# Patient Record
Sex: MALE | Race: WHITE | NOT HISPANIC OR LATINO | URBAN - METROPOLITAN AREA
[De-identification: names, ages, dates, MRNs, and addresses within clinical notes are randomized per-mention and may not be internally consistent; named-entity substitution may affect disease eponyms.]

---

## 2020-07-25 ENCOUNTER — TELEPHONE ENCOUNTER (OUTPATIENT)
Dept: URBAN - METROPOLITAN AREA CLINIC 13 | Facility: CLINIC | Age: 55
End: 2020-07-25

## 2020-07-25 RX ORDER — POLYETHYLENE GLYCOL 3350, SODIUM SULFATE, SODIUM CHLORIDE, POTASSIUM CHLORIDE, ASCORBIC ACID, SODIUM ASCORBATE 7.5-2.691G
TAKE 32 OZ AS DIRECTED 5:00PM THE EVENING BEFORE AND 6HR PRIOR TO PROCEDURE KIT ORAL
Qty: 1 | Refills: 0 | OUTPATIENT
Start: 2015-01-27 | End: 2015-04-27

## 2020-07-25 RX ORDER — NAPROXEN SODIUM 220 MG
TAKE 1 TABLET EVERY 12 HOURS DAILY TABLET ORAL
Refills: 0 | OUTPATIENT
End: 2015-04-27

## 2020-07-26 ENCOUNTER — TELEPHONE ENCOUNTER (OUTPATIENT)
Dept: URBAN - METROPOLITAN AREA CLINIC 13 | Facility: CLINIC | Age: 55
End: 2020-07-26

## 2020-07-26 RX ORDER — LEVOFLOXACIN 500 MG/1
TABLET, FILM COATED ORAL
Qty: 14 | Refills: 0 | Status: ACTIVE | COMMUNITY
Start: 2015-03-12

## 2020-07-26 RX ORDER — OMEPRAZOLE 20 MG/1
CAPSULE, DELAYED RELEASE ORAL
Qty: 30 | Refills: 0 | Status: ACTIVE | COMMUNITY
Start: 2015-02-10

## 2020-07-26 RX ORDER — AZELASTINE HYDROCHLORIDE AND FLUTICASONE PROPIONATE 137; 50 UG/1; UG/1
SPRAY, METERED NASAL
Qty: 23 | Refills: 0 | Status: ACTIVE | COMMUNITY
Start: 2015-03-06

## 2020-07-26 RX ORDER — ROSUVASTATIN CALCIUM 20 MG
TAKE 1 TABLET DAILY TABLET ORAL
Refills: 0 | Status: ACTIVE | COMMUNITY

## 2021-05-13 ENCOUNTER — LAB OUTSIDE AN ENCOUNTER (OUTPATIENT)
Dept: URBAN - METROPOLITAN AREA CLINIC 113 | Facility: CLINIC | Age: 56
End: 2021-05-13

## 2021-05-13 ENCOUNTER — TELEPHONE ENCOUNTER (OUTPATIENT)
Dept: URBAN - METROPOLITAN AREA CLINIC 113 | Facility: CLINIC | Age: 56
End: 2021-05-13

## 2021-05-13 VITALS — HEIGHT: 72 IN | BODY MASS INDEX: 34.13 KG/M2 | WEIGHT: 252 LBS

## 2021-05-13 RX ORDER — ROSUVASTATIN CALCIUM 20 MG
TAKE 1 TABLET DAILY TABLET ORAL
Refills: 0 | Status: ACTIVE | COMMUNITY

## 2021-05-13 RX ORDER — OMEPRAZOLE 20 MG/1
CAPSULE, DELAYED RELEASE ORAL
Qty: 30 | Refills: 0 | Status: ACTIVE | COMMUNITY
Start: 2015-02-10

## 2021-05-13 RX ORDER — AZELASTINE HYDROCHLORIDE AND FLUTICASONE PROPIONATE 137; 50 UG/1; UG/1
SPRAY, METERED NASAL
Qty: 23 | Refills: 0 | Status: DISCONTINUED | COMMUNITY
Start: 2015-03-06

## 2021-05-13 RX ORDER — SODIUM, POTASSIUM,MAG SULFATES 17.5-3.13G
354ML SOLUTION, RECONSTITUTED, ORAL ORAL
Qty: 354 MILLILITER | Refills: 0 | OUTPATIENT
Start: 2021-05-13 | End: 2021-05-14

## 2021-05-13 RX ORDER — LEVOFLOXACIN 500 MG/1
TABLET, FILM COATED ORAL
Qty: 14 | Refills: 0 | Status: DISCONTINUED | COMMUNITY
Start: 2015-03-12

## 2021-05-13 RX ORDER — AMLODIPINE AND ATORVASTATIN 5; 10 MG/1; MG/1
1 TABLET TABLET, FILM COATED ORAL ONCE A DAY
Status: ACTIVE | COMMUNITY

## 2021-06-10 ENCOUNTER — TELEPHONE ENCOUNTER (OUTPATIENT)
Dept: URBAN - METROPOLITAN AREA CLINIC 113 | Facility: CLINIC | Age: 56
End: 2021-06-10

## 2021-06-14 ENCOUNTER — OFFICE VISIT (OUTPATIENT)
Dept: URBAN - METROPOLITAN AREA SURGERY CENTER 25 | Facility: SURGERY CENTER | Age: 56
End: 2021-06-14

## 2021-06-28 ENCOUNTER — OFFICE VISIT (OUTPATIENT)
Dept: URBAN - METROPOLITAN AREA SURGERY CENTER 25 | Facility: SURGERY CENTER | Age: 56
End: 2021-06-28
Payer: COMMERCIAL

## 2021-06-28 ENCOUNTER — CLAIMS CREATED FROM THE CLAIM WINDOW (OUTPATIENT)
Dept: URBAN - METROPOLITAN AREA CLINIC 4 | Facility: CLINIC | Age: 56
End: 2021-06-28
Payer: COMMERCIAL

## 2021-06-28 DIAGNOSIS — D12.2 ADENOMA OF ASCENDING COLON: ICD-10-CM

## 2021-06-28 DIAGNOSIS — Z86.010 H/O ADENOMATOUS POLYP OF COLON: ICD-10-CM

## 2021-06-28 DIAGNOSIS — K51.40 INFLAMMATORY POLYPS OF COLON WITHOUT COMPLICATIONS: ICD-10-CM

## 2021-06-28 DIAGNOSIS — K63.89 POLYP OF ILEUM: ICD-10-CM

## 2021-06-28 PROCEDURE — 88305 TISSUE EXAM BY PATHOLOGIST: CPT | Performed by: PATHOLOGY

## 2021-06-28 PROCEDURE — G8907 PT DOC NO EVENTS ON DISCHARG: HCPCS | Performed by: INTERNAL MEDICINE

## 2021-06-28 PROCEDURE — 45385 COLONOSCOPY W/LESION REMOVAL: CPT | Performed by: INTERNAL MEDICINE

## 2021-06-28 RX ORDER — OMEPRAZOLE 20 MG/1
CAPSULE, DELAYED RELEASE ORAL
Qty: 30 | Refills: 0 | Status: ACTIVE | COMMUNITY
Start: 2015-02-10

## 2021-06-28 RX ORDER — ROSUVASTATIN CALCIUM 20 MG
TAKE 1 TABLET DAILY TABLET ORAL
Refills: 0 | Status: ACTIVE | COMMUNITY

## 2021-06-28 RX ORDER — AMLODIPINE AND ATORVASTATIN 5; 10 MG/1; MG/1
1 TABLET TABLET, FILM COATED ORAL ONCE A DAY
Status: ACTIVE | COMMUNITY

## 2021-08-20 ENCOUNTER — OFFICE VISIT (OUTPATIENT)
Dept: URBAN - METROPOLITAN AREA CLINIC 113 | Facility: CLINIC | Age: 56
End: 2021-08-20

## 2021-08-20 ENCOUNTER — TELEPHONE ENCOUNTER (OUTPATIENT)
Dept: URBAN - METROPOLITAN AREA CLINIC 107 | Facility: CLINIC | Age: 56
End: 2021-08-20

## 2021-08-20 RX ORDER — OMEPRAZOLE 20 MG/1
CAPSULE, DELAYED RELEASE ORAL
Qty: 30 | Refills: 0 | Status: ACTIVE | COMMUNITY
Start: 2015-02-10

## 2021-08-20 RX ORDER — ROSUVASTATIN CALCIUM 20 MG
TAKE 1 TABLET DAILY TABLET ORAL
Refills: 0 | Status: ACTIVE | COMMUNITY

## 2021-08-20 RX ORDER — AMLODIPINE AND ATORVASTATIN 5; 10 MG/1; MG/1
1 TABLET TABLET, FILM COATED ORAL ONCE A DAY
Status: ACTIVE | COMMUNITY

## 2021-09-20 ENCOUNTER — TELEPHONE ENCOUNTER (OUTPATIENT)
Dept: URBAN - METROPOLITAN AREA CLINIC 113 | Facility: CLINIC | Age: 56
End: 2021-09-20

## 2023-03-23 ENCOUNTER — LAB OUTSIDE AN ENCOUNTER (OUTPATIENT)
Dept: URBAN - METROPOLITAN AREA CLINIC 113 | Facility: CLINIC | Age: 58
End: 2023-03-23

## 2023-03-23 ENCOUNTER — WEB ENCOUNTER (OUTPATIENT)
Dept: URBAN - METROPOLITAN AREA CLINIC 113 | Facility: CLINIC | Age: 58
End: 2023-03-23

## 2023-03-23 ENCOUNTER — OFFICE VISIT (OUTPATIENT)
Dept: URBAN - METROPOLITAN AREA CLINIC 113 | Facility: CLINIC | Age: 58
End: 2023-03-23
Payer: COMMERCIAL

## 2023-03-23 VITALS
DIASTOLIC BLOOD PRESSURE: 74 MMHG | BODY MASS INDEX: 34.13 KG/M2 | WEIGHT: 252 LBS | HEART RATE: 72 BPM | HEIGHT: 72 IN | RESPIRATION RATE: 18 BRPM | TEMPERATURE: 97.3 F | SYSTOLIC BLOOD PRESSURE: 114 MMHG

## 2023-03-23 DIAGNOSIS — Z86.010 HISTORY OF ADENOMATOUS POLYP OF COLON: ICD-10-CM

## 2023-03-23 DIAGNOSIS — R74.8 ELEVATED LIVER ENZYMES: ICD-10-CM

## 2023-03-23 DIAGNOSIS — K21.9 GASTROESOPHAGEAL REFLUX DISEASE WITHOUT ESOPHAGITIS: ICD-10-CM

## 2023-03-23 DIAGNOSIS — K76.0 FATTY LIVER: ICD-10-CM

## 2023-03-23 DIAGNOSIS — K22.70 BARRETT'S ESOPHAGUS WITHOUT DYSPLASIA: ICD-10-CM

## 2023-03-23 PROBLEM — 429047008: Status: ACTIVE | Noted: 2023-03-23

## 2023-03-23 PROBLEM — 266435005: Status: ACTIVE | Noted: 2023-03-23

## 2023-03-23 PROBLEM — 302914006: Status: ACTIVE | Noted: 2023-03-23

## 2023-03-23 PROCEDURE — 99214 OFFICE O/P EST MOD 30 MIN: CPT | Performed by: NURSE PRACTITIONER

## 2023-03-23 RX ORDER — OMEPRAZOLE 20 MG/1
CAPSULE, DELAYED RELEASE ORAL
Qty: 30 | Refills: 0 | Status: ACTIVE | COMMUNITY
Start: 2015-02-10

## 2023-03-23 RX ORDER — AMLODIPINE AND ATORVASTATIN 5; 10 MG/1; MG/1
1 TABLET TABLET, FILM COATED ORAL ONCE A DAY
Status: ON HOLD | COMMUNITY

## 2023-03-23 RX ORDER — ROSUVASTATIN CALCIUM 20 MG
TAKE 1 TABLET DAILY TABLET ORAL
Refills: 0 | Status: ACTIVE | COMMUNITY

## 2023-03-23 NOTE — HPI-OTHER HISTORIES
Labs 2/14/2023 (reviewed post visit):BMP normal with exception of glucose 113, albumin 5.  LFTs: TB 0.5, ALP 74, , AST 86.  Cholesterol panel normal with exception of triglycerides 307, HDL 36, VLDL 51.  Hemoglobin A1c 5.8.  HIV nonreactive. Colonoscopy 6/28/2021: BBPS 9 (Suprep), sigmoid and descending diverticulosis, mild grade 1 internal hemorrhoids, removal of 2 semipedunculated transverse and ascending 9-11 mm polyps, normal terminal ileum.  Pathology: Transverse polyp was inflammatory and ascending polyp was a sessile serrated adenoma.  Surveillance recommended in 3 years. EGD 3/5/2015:Irregular Z-line status post biopsy, normal stomach, normal examined duodenum.  Pathology: GE junction biopsies demonstrated Wetzel's esophagus.  Negative for dysplasia.  Squamous mucosa with mild reflux changes.

## 2023-03-24 PROBLEM — 197321007: Status: ACTIVE | Noted: 2023-03-24

## 2023-03-24 PROBLEM — 707724006: Status: ACTIVE | Noted: 2023-03-24

## 2023-06-15 ENCOUNTER — OFFICE VISIT (OUTPATIENT)
Dept: URBAN - METROPOLITAN AREA SURGERY CENTER 25 | Facility: SURGERY CENTER | Age: 58
End: 2023-06-15

## 2023-09-08 ENCOUNTER — OUT OF OFFICE VISIT (OUTPATIENT)
Dept: URBAN - METROPOLITAN AREA SURGERY CENTER 25 | Facility: SURGERY CENTER | Age: 58
End: 2023-09-08
Payer: COMMERCIAL

## 2023-09-08 ENCOUNTER — CLAIMS CREATED FROM THE CLAIM WINDOW (OUTPATIENT)
Dept: URBAN - METROPOLITAN AREA CLINIC 4 | Facility: CLINIC | Age: 58
End: 2023-09-08
Payer: COMMERCIAL

## 2023-09-08 ENCOUNTER — WEB ENCOUNTER (OUTPATIENT)
Dept: URBAN - METROPOLITAN AREA SURGERY CENTER 25 | Facility: SURGERY CENTER | Age: 58
End: 2023-09-08

## 2023-09-08 DIAGNOSIS — K22.70 BARRETT'S ESOPHAGUS WITHOUT DYSPLASIA: ICD-10-CM

## 2023-09-08 DIAGNOSIS — K21.9 GASTROESOPHAGEAL REFLUX DISEASE WITHOUT ESOPHAGITIS: ICD-10-CM

## 2023-09-08 DIAGNOSIS — K21.9 GASTRO-ESOPHAGEAL REFLUX DISEASE WITHOUT ESOPHAGITIS: ICD-10-CM

## 2023-09-08 DIAGNOSIS — R12 HEARTBURN: ICD-10-CM

## 2023-09-08 DIAGNOSIS — K44.9 HIATAL HERNIA: ICD-10-CM

## 2023-09-08 DIAGNOSIS — R12 BURNING REFLUX: ICD-10-CM

## 2023-09-08 DIAGNOSIS — K22.89 OTHER SPECIFIED DISEASE OF ESOPHAGUS: ICD-10-CM

## 2023-09-08 DIAGNOSIS — K22.70 BARRETT ESOPHAGUS: ICD-10-CM

## 2023-09-08 PROCEDURE — 88305 TISSUE EXAM BY PATHOLOGIST: CPT | Performed by: PATHOLOGY

## 2023-09-08 PROCEDURE — 43239 EGD BIOPSY SINGLE/MULTIPLE: CPT | Performed by: INTERNAL MEDICINE

## 2023-09-08 PROCEDURE — 00731 ANES UPR GI NDSC PX NOS: CPT | Performed by: ANESTHESIOLOGY

## 2023-09-08 PROCEDURE — G8907 PT DOC NO EVENTS ON DISCHARG: HCPCS | Performed by: INTERNAL MEDICINE

## 2023-09-08 RX ORDER — AMLODIPINE AND ATORVASTATIN 5; 10 MG/1; MG/1
1 TABLET TABLET, FILM COATED ORAL ONCE A DAY
Status: ON HOLD | COMMUNITY

## 2023-09-08 RX ORDER — OMEPRAZOLE 20 MG/1
CAPSULE, DELAYED RELEASE ORAL
Qty: 30 | Refills: 0 | Status: ACTIVE | COMMUNITY
Start: 2015-02-10

## 2023-09-08 RX ORDER — ROSUVASTATIN CALCIUM 20 MG
TAKE 1 TABLET DAILY TABLET ORAL
Refills: 0 | Status: ACTIVE | COMMUNITY

## 2023-12-01 ENCOUNTER — LAB OUTSIDE AN ENCOUNTER (OUTPATIENT)
Dept: URBAN - METROPOLITAN AREA CLINIC 113 | Facility: CLINIC | Age: 58
End: 2023-12-01

## 2023-12-01 ENCOUNTER — DASHBOARD ENCOUNTERS (OUTPATIENT)
Age: 58
End: 2023-12-01

## 2023-12-01 ENCOUNTER — OFFICE VISIT (OUTPATIENT)
Dept: URBAN - METROPOLITAN AREA CLINIC 113 | Facility: CLINIC | Age: 58
End: 2023-12-01
Payer: COMMERCIAL

## 2023-12-01 VITALS
DIASTOLIC BLOOD PRESSURE: 68 MMHG | HEART RATE: 65 BPM | RESPIRATION RATE: 16 BRPM | WEIGHT: 236 LBS | BODY MASS INDEX: 31.97 KG/M2 | TEMPERATURE: 97.8 F | SYSTOLIC BLOOD PRESSURE: 138 MMHG | HEIGHT: 72 IN

## 2023-12-01 DIAGNOSIS — K22.70 BARRETT'S ESOPHAGUS WITHOUT DYSPLASIA: ICD-10-CM

## 2023-12-01 DIAGNOSIS — Z86.010 HISTORY OF ADENOMATOUS POLYP OF COLON: ICD-10-CM

## 2023-12-01 DIAGNOSIS — K21.9 GASTROESOPHAGEAL REFLUX DISEASE WITHOUT ESOPHAGITIS: ICD-10-CM

## 2023-12-01 DIAGNOSIS — K76.0 FATTY LIVER: ICD-10-CM

## 2023-12-01 DIAGNOSIS — R74.8 ELEVATED LIVER ENZYMES: ICD-10-CM

## 2023-12-01 PROCEDURE — 99214 OFFICE O/P EST MOD 30 MIN: CPT | Performed by: NURSE PRACTITIONER

## 2023-12-01 RX ORDER — AMLODIPINE AND ATORVASTATIN 5; 10 MG/1; MG/1
1 TABLET TABLET, FILM COATED ORAL ONCE A DAY
Status: ON HOLD | COMMUNITY

## 2023-12-01 RX ORDER — ROSUVASTATIN CALCIUM 20 MG
TAKE 1 TABLET DAILY TABLET ORAL
Refills: 0 | Status: ACTIVE | COMMUNITY

## 2023-12-01 RX ORDER — VALSARTAN AND HYDROCHLOROTHIAZIDE 320; 25 MG/1; MG/1
1 TABLET TABLET, FILM COATED ORAL ONCE A DAY
Status: ACTIVE | COMMUNITY

## 2023-12-01 RX ORDER — ICOSAPENT ETHYL 1000 MG/1
2 CAPSULES WITH MEALS CAPSULE ORAL TWICE A DAY
Status: ACTIVE | COMMUNITY

## 2023-12-01 RX ORDER — OMEPRAZOLE 20 MG/1
CAPSULE, DELAYED RELEASE ORAL
Qty: 30 | Refills: 0 | Status: ACTIVE | COMMUNITY
Start: 2015-02-10

## 2023-12-01 NOTE — HPI-TODAY'S VISIT:
This is a 58-year-old male with a history of hypertension, hyperlipidemia, prediabetes, sleep apnea, hepatic steatosis, GERD, Wetzel's esophagus, adenomatous colon polyps due surveillance in June 2024 presenting for follow-up. He was last seen in the office 3/23/2023.  GERD was controlled with omeprazole 20 mg daily.  He had a history of Wetzel's esophagus noted on biopsies of an irregular Z-line.  He was scheduled for a surveillance EGD.  In regard to elevated liver enzymes associated with fatty liver, prior records were reviewed post visit indicating he had undergone an evaluation in 1999 and included negative hepatitis serologies, autoimmune studies, ceruloplasmin, and alpha-fetoprotein.  He had undergone a liver biopsy for which pathology report was unavailable but the patient reported that it was positive for fatty liver.  Hepatitis serologies were reordered in 2015 but there were no results connected with that order.  Abdominal ultrasound 2017 demonstrated mild fatty infiltration.  Elastography demonstrated no evidence of fibrosis.  This was to be readdressed at follow-up.  Repeating hepatitis panel was a consideration.  He is taking omeprazole 20 mg daily.  Acid reflux is well controlled.  He denies any other abdominal symptoms. He drinks 2 beers or 1 alcoholic beverage twice a week.

## 2023-12-01 NOTE — HPI-OTHER HISTORIES
EGD 9/8/2023:Esophageal mucosal changes secondary to established short segment Wetzel's status post biopsy, medium size hiatal hernia, normal stomach, normal examined duodenum. Pathology: GE junction biopsies demonstrated gastric type mucosa without significant abnormality; no squamous mucosa identified.  Labs  9/18/2023:BMP normal with exception of glucose 134. LFTs: TB 0.6, ALP 62, ALT 97, AST 56. 8/1/2023:CBC: WBC 5.4, hemoglobin 15.1, MCV 97, platelet 166. BMP normal with exception of glucose 117. LFTs: TB 0.4, ALP 63, , AST 82. Urinalysis normal. Cholesterol panel normal with exception of triglycerides 275, HDL 35, VLDL 46. Hemoglobin A1c 5.7. Vitamin B12 935. TSH 2.130. PSA 0.3. 2/14/2023 (reviewed post visit):BMP normal with exception of glucose 113, albumin 5.  LFTs: TB 0.5, ALP 74, , AST 86.  Cholesterol panel normal with exception of triglycerides 307, HDL 36, VLDL 51.  Hemoglobin A1c 5.8.  HIV nonreactive.  Colonoscopy 6/28/2021: BBPS 9 (Suprep), sigmoid and descending diverticulosis, mild grade 1 internal hemorrhoids, removal of 2 semipedunculated transverse and ascending 9-11 mm polyps, normal terminal ileum.  Pathology: Transverse polyp was inflammatory and ascending polyp was a sessile serrated adenoma.  Surveillance recommended in 2024.  EGD 3/5/2015:Irregular Z-line status post biopsy, normal stomach, normal examined duodenum.  Pathology: GE junction biopsies demonstrated Wetzel's esophagus.  Negative for dysplasia.  Squamous mucosa with mild reflux changes.

## 2023-12-04 LAB
HEPATITIS A AB, TOTAL: REACTIVE
HEPATITIS B CORE AB TOTAL: (no result)
HEPATITIS B SURFACE AB IMMUNITY, QN: <5
HEPATITIS B SURFACE ANTIGEN: (no result)
HEPATITIS C ANTIBODY: (no result)

## 2024-03-25 ENCOUNTER — LAB (OUTPATIENT)
Dept: URBAN - METROPOLITAN AREA CLINIC 113 | Facility: CLINIC | Age: 59
End: 2024-03-25

## 2024-06-12 ENCOUNTER — CLAIMS CREATED FROM THE CLAIM WINDOW (OUTPATIENT)
Dept: URBAN - METROPOLITAN AREA SURGERY CENTER 25 | Facility: SURGERY CENTER | Age: 59
End: 2024-06-12
Payer: COMMERCIAL

## 2024-06-12 ENCOUNTER — CLAIMS CREATED FROM THE CLAIM WINDOW (OUTPATIENT)
Dept: URBAN - METROPOLITAN AREA CLINIC 4 | Facility: CLINIC | Age: 59
End: 2024-06-12
Payer: COMMERCIAL

## 2024-06-12 DIAGNOSIS — K63.5 BENIGN COLON POLYPS: ICD-10-CM

## 2024-06-12 DIAGNOSIS — D12.3 BENIGN NEOPLASM OF TRANSVERSE COLON: ICD-10-CM

## 2024-06-12 DIAGNOSIS — Z86.010 ADENOMAS PERSONAL HISTORY OF COLONIC POLYPS: ICD-10-CM

## 2024-06-12 DIAGNOSIS — Z12.11 COLON CANCER SCREENING (HIGH RISK): ICD-10-CM

## 2024-06-12 DIAGNOSIS — K57.30 COLON, DIVERTICULOSIS: ICD-10-CM

## 2024-06-12 DIAGNOSIS — D12.3 ADENOMA OF TRANSVERSE COLON: ICD-10-CM

## 2024-06-12 DIAGNOSIS — K63.89 OTHER SPECIFIED DISEASES OF INTESTINE: ICD-10-CM

## 2024-06-12 PROCEDURE — 45385 COLONOSCOPY W/LESION REMOVAL: CPT | Performed by: INTERNAL MEDICINE

## 2024-06-12 PROCEDURE — 00812 ANES LWR INTST SCR COLSC: CPT | Performed by: ANESTHESIOLOGY

## 2024-06-12 PROCEDURE — 00812 ANES LWR INTST SCR COLSC: CPT | Performed by: ANESTHESIOLOGIST ASSISTANT

## 2024-06-12 PROCEDURE — 88305 TISSUE EXAM BY PATHOLOGIST: CPT | Performed by: PATHOLOGY

## 2024-06-12 RX ORDER — VALSARTAN AND HYDROCHLOROTHIAZIDE 320; 25 MG/1; MG/1
1 TABLET TABLET, FILM COATED ORAL ONCE A DAY
Status: ACTIVE | COMMUNITY

## 2024-06-12 RX ORDER — ROSUVASTATIN CALCIUM 20 MG
TAKE 1 TABLET DAILY TABLET ORAL
Refills: 0 | Status: ACTIVE | COMMUNITY

## 2024-06-12 RX ORDER — AMLODIPINE AND ATORVASTATIN 5; 10 MG/1; MG/1
1 TABLET TABLET, FILM COATED ORAL ONCE A DAY
Status: ON HOLD | COMMUNITY

## 2024-06-12 RX ORDER — ICOSAPENT ETHYL 1000 MG/1
2 CAPSULES WITH MEALS CAPSULE ORAL TWICE A DAY
Status: ACTIVE | COMMUNITY

## 2024-06-12 RX ORDER — OMEPRAZOLE 20 MG/1
CAPSULE, DELAYED RELEASE ORAL
Qty: 30 | Refills: 0 | Status: ACTIVE | COMMUNITY
Start: 2015-02-10

## 2024-07-10 ENCOUNTER — OFFICE VISIT (OUTPATIENT)
Dept: URBAN - METROPOLITAN AREA CLINIC 113 | Facility: CLINIC | Age: 59
End: 2024-07-10

## 2024-08-16 ENCOUNTER — OFFICE VISIT (OUTPATIENT)
Dept: URBAN - METROPOLITAN AREA CLINIC 113 | Facility: CLINIC | Age: 59
End: 2024-08-16

## 2024-08-16 NOTE — HPI-TODAY'S VISIT:
This is a 59-year-old female with a history of hypertension, hyperlipidemia, prediabetes, sleep apnea, hepatic steatosis, GERD, Wetzel's esophagus, and adenomatous colon polyps due surveillance in 2029 presenting for follow-up. He was last seen in the office 12/1/2023.  GERD was controlled with omeprazole 20 mg daily.  He had undergone recent EGD for Wetzel's surveillance showing mucosal changes consistent with short segment Wetzel's with biopsies negative for intestinal metaplasia.  He was to continue daily omeprazole.  Repeat EGD planned in 2026.  He had liver enzyme elevation associated with fatty liver.  He had undergone evaluation for chronic sources of liver disease in 1999 and had an ultrasound reportedly showing fatty liver.  There was mild fatty infiltration on abdominal ultrasound in 2017 and elastography demonstrated no fibrosis.  A repeat hepatitis panel to rescreen for viral sources of liver disease was recommended.  EGD with elastography was ordered.  He was to continue to minimize alcohol and continue efforts toward cholesterol control and weight control. Colonoscopy 6/12/2024:BBPS 9, internal hemorrhoids, removal of 2 sessile 4 to 6 mm ascending and transverse polyps, sigmoid and descending diverticulosis, otherwise normal to the terminal ileum.  Pathology: Ascending polyp was a benign mucosal polyp and transverse polyp was a tubular adenoma.  Surveillance recommended in 2029. Labs 12/1/2023:Hepatitis B surface antibody negative for immunity.  Hepatitis A antibody total reactive.  Hepatitis B core antibody total, hepatitis B surface antigen, hepatitis C antibody negative/nonreactive. Abdominal ultrasound with elastography 1/10/2024:Hepatomegaly with moderate hepatic steatosis.  10 x 8 x 8 mm cyst in the right hepatic lobe with thin septation.  No solid hepatic mass.  [place] score of F1 corresponding to normal to mild liver fibrosis staging.

## 2024-09-20 ENCOUNTER — OFFICE VISIT (OUTPATIENT)
Dept: URBAN - METROPOLITAN AREA CLINIC 113 | Facility: CLINIC | Age: 59
End: 2024-09-20
Payer: COMMERCIAL

## 2024-09-20 VITALS
BODY MASS INDEX: 28.71 KG/M2 | HEART RATE: 65 BPM | SYSTOLIC BLOOD PRESSURE: 113 MMHG | WEIGHT: 212 LBS | HEIGHT: 72 IN | RESPIRATION RATE: 18 BRPM | DIASTOLIC BLOOD PRESSURE: 68 MMHG

## 2024-09-20 DIAGNOSIS — Z86.010 HISTORY OF ADENOMATOUS POLYP OF COLON: ICD-10-CM

## 2024-09-20 DIAGNOSIS — K21.9 GASTROESOPHAGEAL REFLUX DISEASE WITHOUT ESOPHAGITIS: ICD-10-CM

## 2024-09-20 DIAGNOSIS — R74.8 ELEVATED LIVER ENZYMES: ICD-10-CM

## 2024-09-20 DIAGNOSIS — K22.70 BARRETT'S ESOPHAGUS WITHOUT DYSPLASIA: ICD-10-CM

## 2024-09-20 PROCEDURE — 99213 OFFICE O/P EST LOW 20 MIN: CPT | Performed by: NURSE PRACTITIONER

## 2024-09-20 RX ORDER — ROSUVASTATIN CALCIUM 20 MG
TAKE 1 TABLET DAILY TABLET ORAL
Refills: 0 | Status: ACTIVE | COMMUNITY

## 2024-09-20 RX ORDER — VALSARTAN AND HYDROCHLOROTHIAZIDE 320; 25 MG/1; MG/1
1 TABLET TABLET, FILM COATED ORAL ONCE A DAY
Status: ACTIVE | COMMUNITY

## 2024-09-20 RX ORDER — AMLODIPINE AND ATORVASTATIN 5; 10 MG/1; MG/1
1 TABLET TABLET, FILM COATED ORAL ONCE A DAY
Status: ON HOLD | COMMUNITY

## 2024-09-20 RX ORDER — OMEPRAZOLE 20 MG/1
CAPSULE, DELAYED RELEASE ORAL
Qty: 30 | Refills: 0 | Status: ACTIVE | COMMUNITY
Start: 2015-02-10

## 2024-09-20 RX ORDER — ICOSAPENT ETHYL 1000 MG/1
2 CAPSULES WITH MEALS CAPSULE ORAL TWICE A DAY
Status: ACTIVE | COMMUNITY

## 2024-09-20 NOTE — HPI-OTHER HISTORIES
Labs 8/20/2024:CBC: WBC 5.8, hemoglobin 14.7, MCV 98, platelet 194. BMP normal with exception of glucose 103, creatinine 0.74. LFTs: TB 0.6, ALP 51, ALT 46, AST 28. Urinalysis unremarkable. Lipid panel normal with exception of triglycerides 174. TSH 2.640. Vitamin B12 744. Hemoglobin A1c 5.5. PSA 0.4. Calculated fib 4 = 0.88  Colonoscopy 6/12/2024:BBPS 9, internal hemorrhoids, removal of 2 sessile 4 to 6 mm ascending and transverse polyps, sigmoid and descending diverticulosis, otherwise normal to the terminal ileum. Pathology: Ascending polyp was a benign mucosal polyp and transverse polyp was a tubular adenoma. Surveillance recommended in 2029.  Abdominal ultrasound with elastography 1/10/2024:Hepatomegaly with moderate hepatic steatosis. 10 x 8 x 8 mm cyst in the right hepatic lobe with thin septation. No solid hepatic mass. [place] score of F1 corresponding to normal to mild liver fibrosis staging.  EGD 9/8/2023:Esophageal mucosal changes secondary to established short segment Wetzel's status post biopsy, medium size hiatal hernia, normal stomach, normal examined duodenum. Pathology: GE junction biopsies demonstrated gastric type mucosa without significant abnormality; no squamous mucosa identified.  Labs  12/1/2023:Hepatitis B surface antibody negative for immunity. Hepatitis A antibody total reactive. Hepatitis B core antibody total, hepatitis B surface antigen, hepatitis C antibody negative/nonreactive. 9/18/2023:BMP normal with exception of glucose 134. LFTs: TB 0.6, ALP 62, ALT 97, AST 56. 8/1/2023:CBC: WBC 5.4, hemoglobin 15.1, MCV 97, platelet 166. BMP normal with exception of glucose 117. LFTs: TB 0.4, ALP 63, , AST 82. Urinalysis normal. Cholesterol panel normal with exception of triglycerides 275, HDL 35, VLDL 46. Hemoglobin A1c 5.7. Vitamin B12 935. TSH 2.130. PSA 0.3. 2/14/2023 (reviewed post visit):BMP normal with exception of glucose 113, albumin 5.  LFTs: TB 0.5, ALP 74, , AST 86.  Cholesterol panel normal with exception of triglycerides 307, HDL 36, VLDL 51.  Hemoglobin A1c 5.8.  HIV nonreactive.  Colonoscopy 6/28/2021: BBPS 9 (Suprep), sigmoid and descending diverticulosis, mild grade 1 internal hemorrhoids, removal of 2 semipedunculated transverse and ascending 9-11 mm polyps, normal terminal ileum.  Pathology: Transverse polyp was inflammatory and ascending polyp was a sessile serrated adenoma.   EGD 3/5/2015:Irregular Z-line status post biopsy, normal stomach, normal examined duodenum.  Pathology: GE junction biopsies demonstrated Wetzel's esophagus.  Negative for dysplasia.  Squamous mucosa with mild reflux changes.

## 2024-09-20 NOTE — HPI-TODAY'S VISIT:
This is a 59-year-old female with a history of hypertension, hyperlipidemia, prediabetes, sleep apnea, hepatic steatosis, GERD, Wetzel's esophagus, and adenomatous colon polyps due surveillance in 2029 presenting for follow-up. He was last seen in the office 12/1/2023.  GERD was controlled with omeprazole 20 mg daily.  He had undergone recent EGD for Wetzel's surveillance showing mucosal changes consistent with short segment Wetzel's with biopsies negative for intestinal metaplasia.  He was to continue daily omeprazole.  Repeat EGD planned in 2026.  He had liver enzyme elevation associated with fatty liver.  He had undergone evaluation for chronic sources of liver disease in 1999 and had an ultrasound reportedly showing fatty liver.  There was mild fatty infiltration on abdominal ultrasound in 2017 and elastography demonstrated no fibrosis.  A repeat hepatitis panel to rescreen for viral sources of liver disease was recommended.  EGD with elastography was ordered.  He was to continue to minimize alcohol and continue efforts toward cholesterol control and weight control.  Colonoscopy 6/12/2024:BBPS 9, internal hemorrhoids, removal of 2 sessile 4 to 6 mm ascending and transverse polyps, sigmoid and descending diverticulosis, otherwise normal to the terminal ileum.  Pathology: Ascending polyp was a benign mucosal polyp and transverse polyp was a tubular adenoma.  Surveillance recommended in 2029.  Labs 12/1/2023:Hepatitis B surface antibody negative for immunity.  Hepatitis A antibody total reactive.  Hepatitis B core antibody total, hepatitis B surface antigen, hepatitis C antibody negative/nonreactive.  Abdominal ultrasound with elastography 1/10/2024:Hepatomegaly with moderate hepatic steatosis.  10 x 8 x 8 mm cyst in the right hepatic lobe with thin septation.  No solid hepatic mass.  [place] score of F1 corresponding to normal to mild liver fibrosis staging.  He is doing well.  Acid reflux symptoms are controlled with omeprazole 20 mg daily.  He denies any other abdominal symptoms. He has voluntarily lost weight using metabolic research diet plan.